# Patient Record
Sex: FEMALE | Race: BLACK OR AFRICAN AMERICAN | Employment: STUDENT | URBAN - METROPOLITAN AREA
[De-identification: names, ages, dates, MRNs, and addresses within clinical notes are randomized per-mention and may not be internally consistent; named-entity substitution may affect disease eponyms.]

---
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
## 2025-05-25 NOTE — TELEPHONE ENCOUNTER
Forwarding request to provider for review
Pt mom, xiomara graham, would like a refill on Singulair.  Please call back at  [phone]
Nursing/Occupational therapy/Physical therapy